# Patient Record
Sex: MALE | Race: BLACK OR AFRICAN AMERICAN | NOT HISPANIC OR LATINO | URBAN - METROPOLITAN AREA
[De-identification: names, ages, dates, MRNs, and addresses within clinical notes are randomized per-mention and may not be internally consistent; named-entity substitution may affect disease eponyms.]

---

## 2017-05-04 ENCOUNTER — EMERGENCY (EMERGENCY)
Facility: HOSPITAL | Age: 22
LOS: 1 days | Discharge: HOME | End: 2017-05-04
Attending: EMERGENCY MEDICINE | Admitting: EMERGENCY MEDICINE
Payer: COMMERCIAL

## 2017-05-04 VITALS
HEART RATE: 89 BPM | RESPIRATION RATE: 18 BRPM | OXYGEN SATURATION: 97 % | TEMPERATURE: 98 F | DIASTOLIC BLOOD PRESSURE: 83 MMHG | SYSTOLIC BLOOD PRESSURE: 140 MMHG

## 2017-05-04 DIAGNOSIS — D17.1 BENIGN LIPOMATOUS NEOPLASM OF SKIN AND SUBCUTANEOUS TISSUE OF TRUNK: Chronic | ICD-10-CM

## 2017-05-04 PROCEDURE — 99283 EMERGENCY DEPT VISIT LOW MDM: CPT

## 2017-05-04 RX ORDER — IBUPROFEN 200 MG
600 TABLET ORAL ONCE
Qty: 0 | Refills: 0 | Status: COMPLETED | OUTPATIENT
Start: 2017-05-04 | End: 2017-05-04

## 2017-05-04 RX ADMIN — Medication 600 MILLIGRAM(S): at 20:27

## 2017-05-04 NOTE — ED PROVIDER NOTE - MUSCULOSKELETAL MINIMAL EXAM
mild tenderness to palpation over L parietal area, tenderness L cervical sternocleidomastoid, mild L paravertebral tenderness
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
## 2017-05-04 NOTE — ED PROVIDER NOTE - CHPI ED SYMPTOMS NEG
no difficulty bearing weight/no decreased eating/drinking/no LOC, no abd pain, no nausea, no vomiting, no numbness/weakness,

## 2017-05-04 NOTE — ED PROVIDER NOTE - ENMT, MLM
Airway patent, Nasal mucosa clear. Mouth with normal mucosa. Throat has no vesicles, no oropharyngeal exudates and uvula is midline. Airway patent, Nasal mucosa clear. Mouth with normal mucosa. Throat has no vesicles, no oropharyngeal exudates and uvula is midline.TM nml, no otorrhea
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
## 2017-05-04 NOTE — ED PROVIDER NOTE - NS ED MD SCRIBE ATTENDING SCRIBE SECTIONS
VITAL SIGNS( Pullset)/REVIEW OF SYSTEMS/PHYSICAL EXAM/DISPOSITION/PAST MEDICAL/SURGICAL/SOCIAL HISTORY/HISTORY OF PRESENT ILLNESS/HIV

## 2018-01-08 ENCOUNTER — EMERGENCY (EMERGENCY)
Facility: HOSPITAL | Age: 23
LOS: 0 days | Discharge: HOME | End: 2018-01-08
Attending: EMERGENCY MEDICINE
Payer: COMMERCIAL

## 2018-01-08 VITALS
HEIGHT: 65 IN | WEIGHT: 160.06 LBS | RESPIRATION RATE: 16 BRPM | HEART RATE: 100 BPM | SYSTOLIC BLOOD PRESSURE: 151 MMHG | DIASTOLIC BLOOD PRESSURE: 95 MMHG | OXYGEN SATURATION: 95 % | TEMPERATURE: 98 F

## 2018-01-08 DIAGNOSIS — D17.1 BENIGN LIPOMATOUS NEOPLASM OF SKIN AND SUBCUTANEOUS TISSUE OF TRUNK: Chronic | ICD-10-CM

## 2018-01-08 DIAGNOSIS — J45.901 UNSPECIFIED ASTHMA WITH (ACUTE) EXACERBATION: ICD-10-CM

## 2018-01-08 DIAGNOSIS — Z91.013 ALLERGY TO SEAFOOD: ICD-10-CM

## 2018-01-08 DIAGNOSIS — H54.8 LEGAL BLINDNESS, AS DEFINED IN USA: ICD-10-CM

## 2018-01-08 PROCEDURE — 99284 EMERGENCY DEPT VISIT MOD MDM: CPT

## 2018-01-08 RX ORDER — ALBUTEROL 90 UG/1
2 AEROSOL, METERED ORAL
Qty: 1 | Refills: 0 | OUTPATIENT
Start: 2018-01-08 | End: 2018-02-06

## 2018-01-08 RX ORDER — IPRATROPIUM/ALBUTEROL SULFATE 18-103MCG
3 AEROSOL WITH ADAPTER (GRAM) INHALATION ONCE
Qty: 0 | Refills: 0 | Status: COMPLETED | OUTPATIENT
Start: 2018-01-08 | End: 2018-01-08

## 2018-01-08 RX ORDER — ALBUTEROL 90 UG/1
2 AEROSOL, METERED ORAL
Qty: 1 | Refills: 0 | OUTPATIENT
Start: 2018-01-08 | End: 2018-01-11

## 2018-01-08 RX ADMIN — Medication 3 MILLILITER(S): at 21:15

## 2018-01-08 RX ADMIN — Medication 60 MILLIGRAM(S): at 20:57

## 2018-01-08 RX ADMIN — Medication 3 MILLILITER(S): at 20:57

## 2018-01-08 NOTE — ED PROVIDER NOTE - MEDICAL DECISION MAKING DETAILS
Patient presents with asthma exacerbation, stable, +hypertensive here - discussed with him will need PMD fu. Plan for nebs, steroids, observation.

## 2018-01-08 NOTE — ED PROVIDER NOTE - OBJECTIVE STATEMENT
22M here with asthma exacerbation. He reports a history of asthma, here with 1 day of nonproductive cough, nasal congestion, chest tightness, feels like prior episode of asthma. No chest pain. No fever, chills, nausea, vomiting. No leg pain or swelling. He has been hospitalized for asthma but never intubated. He takes albuterol pump PRN no other meds. No tobacco use.

## 2018-01-08 NOTE — ED PROVIDER NOTE - ATTENDING CONTRIBUTION TO CARE
22 year old male PMHx asthma c/o asthma attack. PE: NAD, mild expiratory wheezing, respirations nonlabored. I&P: asthma exacerbation, bronchodilators, corticosteroids, PMD follow up

## 2018-01-08 NOTE — ED ADULT NURSE NOTE - OBJECTIVE STATEMENT
patient stated that his asthma was acting up for 2 days and got worse tonight, reports cold symptoms, dry cough, denies fever, denies chills

## 2018-04-14 ENCOUNTER — EMERGENCY (EMERGENCY)
Facility: HOSPITAL | Age: 23
LOS: 1 days | Discharge: HOME | End: 2018-04-14
Attending: EMERGENCY MEDICINE | Admitting: EMERGENCY MEDICINE
Payer: COMMERCIAL

## 2018-04-14 VITALS
DIASTOLIC BLOOD PRESSURE: 80 MMHG | HEART RATE: 98 BPM | SYSTOLIC BLOOD PRESSURE: 140 MMHG | RESPIRATION RATE: 18 BRPM | OXYGEN SATURATION: 99 %

## 2018-04-14 VITALS
TEMPERATURE: 98 F | HEART RATE: 102 BPM | DIASTOLIC BLOOD PRESSURE: 84 MMHG | RESPIRATION RATE: 16 BRPM | OXYGEN SATURATION: 97 % | SYSTOLIC BLOOD PRESSURE: 132 MMHG

## 2018-04-14 DIAGNOSIS — D17.1 BENIGN LIPOMATOUS NEOPLASM OF SKIN AND SUBCUTANEOUS TISSUE OF TRUNK: Chronic | ICD-10-CM

## 2018-04-14 PROCEDURE — 99284 EMERGENCY DEPT VISIT MOD MDM: CPT | Mod: 25

## 2018-04-14 RX ORDER — ALBUTEROL 90 UG/1
2.5 AEROSOL, METERED ORAL ONCE
Qty: 0 | Refills: 0 | Status: COMPLETED | OUTPATIENT
Start: 2018-04-14 | End: 2018-04-14

## 2018-04-14 RX ORDER — IPRATROPIUM/ALBUTEROL SULFATE 18-103MCG
3 AEROSOL WITH ADAPTER (GRAM) INHALATION ONCE
Qty: 0 | Refills: 0 | Status: COMPLETED | OUTPATIENT
Start: 2018-04-14 | End: 2018-04-14

## 2018-04-14 RX ORDER — ALBUTEROL 90 UG/1
2 AEROSOL, METERED ORAL
Qty: 1 | Refills: 0 | OUTPATIENT
Start: 2018-04-14

## 2018-04-14 RX ORDER — ALBUTEROL 90 UG/1
2 AEROSOL, METERED ORAL ONCE
Qty: 0 | Refills: 0 | Status: COMPLETED | OUTPATIENT
Start: 2018-04-14 | End: 2018-04-14

## 2018-04-14 RX ADMIN — ALBUTEROL 2 PUFF(S): 90 AEROSOL, METERED ORAL at 09:07

## 2018-04-14 RX ADMIN — Medication 50 MILLIGRAM(S): at 07:57

## 2018-04-14 RX ADMIN — Medication 3 MILLILITER(S): at 07:53

## 2018-04-14 RX ADMIN — ALBUTEROL 2.5 MILLIGRAM(S): 90 AEROSOL, METERED ORAL at 08:01

## 2018-04-14 RX ADMIN — ALBUTEROL 2.5 MILLIGRAM(S): 90 AEROSOL, METERED ORAL at 07:57

## 2018-04-14 NOTE — ED PROVIDER NOTE - MEDICAL DECISION MAKING DETAILS
22M pmh asthma p/w asthma exacerbation.  Will get peak flow, give neb treatments, steroids and reassess need for admission vs discahrge.  - Gwen Bender,

## 2018-04-14 NOTE — ED PROVIDER NOTE - PROGRESS NOTE DETAILS
Peak flow 150 prior to treatment.  - Gwen Bender, DO Peak flow 300 after treatment and patient feels his breathing has improved.  Scattered wheezes on exam, with improved air movement.  - Gwen Bender, DO JAXSON Barnes MD, Attending Physician  Pt comfortable breathing better. P is able to go home

## 2018-04-14 NOTE — ED ADULT TRIAGE NOTE - CHIEF COMPLAINT QUOTE
pt c/o chest tightness and wheezing since last night, no relief from albuterol and nebulizer at home. given 2 albuterol treatments by ems. pt denies chest tightness or sob curerntly. speaking in full sentences, tolerating room air, no respiratory distress noted

## 2018-04-14 NOTE — ED PROVIDER NOTE - CARE PLAN
Principal Discharge DX:	Asthma exacerbation Principal Discharge DX:	Asthma exacerbation  Assessment and plan of treatment:	- 2 prescriptions were sent to your pharmacy - please take as prescribed  - Please follow up with your Primary doctor   - Return to the ER with any worsening shortness of breath, chest pain, or any other concerning symptoms

## 2018-04-14 NOTE — ED PROVIDER NOTE - PHYSICAL EXAMINATION
Gen: NAD, AOx3, speaking in full sentences  Head: NCAT  HEENT: PERRL, oral mucosa moist, normal conjunctiva, no erythema or exudates of throat  Lung: good air movement, diffuse expiratory wheezes, no retractions, no respiratory distress  CV: rrr, no murmurs, Normal perfusion  Abd: soft, NTND  MSK: No edema, no visible deformities  Neuro: No focal neurologic deficits, normal gait  Skin: No rash   Psych: normal affect   - Gwen Ortega, DO

## 2018-04-14 NOTE — ED ADULT NURSE REASSESSMENT NOTE - NS ED NURSE REASSESS COMMENT FT1
peak flow after neb treatments 300. pt feeling better/ wtill haves dry cough/ color good in resp distres/

## 2018-04-14 NOTE — ED PROVIDER NOTE - PLAN OF CARE
- 2 prescriptions were sent to your pharmacy - please take as prescribed  - Please follow up with your Primary doctor   - Return to the ER with any worsening shortness of breath, chest pain, or any other concerning symptoms

## 2018-04-14 NOTE — PROVIDER CONTACT NOTE (OTHER) - ASSESSMENT
SW contacted by Pt's ER MD Loja, informing Pt requires assistance to return home. SW met with Pt who is A&Ox3 and is legally blind he reports no one is available to pick him up and has no money. Pt requires livery assistance return home. SW contacted [facility] [phone] spoke with staff Tish who provided invoice# [id number] for [employer] with ETA of 20 minutes. Pt and medical staff are aware and in agreement with plan. No further SW intervention required at this time.
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
## 2018-04-14 NOTE — ED ADULT NURSE NOTE - OBJECTIVE STATEMENT
Presents to ED complaining of SOB since yesterday that progressively worsen.  History of Asthma, used neb tx at home without relief.  Complaining of cold-like sx.  Audible wheezing noted.  MD at bedside and will continue to monitor patient.
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
## 2019-05-03 ENCOUNTER — EMERGENCY (EMERGENCY)
Facility: HOSPITAL | Age: 24
LOS: 1 days | Discharge: HOME | End: 2019-05-03
Attending: EMERGENCY MEDICINE | Admitting: EMERGENCY MEDICINE
Payer: COMMERCIAL

## 2019-05-03 VITALS
RESPIRATION RATE: 18 BRPM | HEART RATE: 85 BPM | TEMPERATURE: 99 F | OXYGEN SATURATION: 100 % | DIASTOLIC BLOOD PRESSURE: 82 MMHG | SYSTOLIC BLOOD PRESSURE: 130 MMHG

## 2019-05-03 VITALS
DIASTOLIC BLOOD PRESSURE: 84 MMHG | TEMPERATURE: 97 F | OXYGEN SATURATION: 98 % | HEART RATE: 82 BPM | RESPIRATION RATE: 16 BRPM | SYSTOLIC BLOOD PRESSURE: 144 MMHG

## 2019-05-03 DIAGNOSIS — D17.1 BENIGN LIPOMATOUS NEOPLASM OF SKIN AND SUBCUTANEOUS TISSUE OF TRUNK: Chronic | ICD-10-CM

## 2019-05-03 PROCEDURE — 99283 EMERGENCY DEPT VISIT LOW MDM: CPT | Mod: 25

## 2019-05-03 RX ORDER — METHOCARBAMOL 500 MG/1
750 TABLET, FILM COATED ORAL ONCE
Qty: 0 | Refills: 0 | Status: COMPLETED | OUTPATIENT
Start: 2019-05-03 | End: 2019-05-03

## 2019-05-03 RX ORDER — METHOCARBAMOL 500 MG/1
2 TABLET, FILM COATED ORAL
Qty: 30 | Refills: 0 | OUTPATIENT
Start: 2019-05-03 | End: 2019-05-07

## 2019-05-03 RX ORDER — ACETAMINOPHEN 500 MG
975 TABLET ORAL ONCE
Qty: 0 | Refills: 0 | Status: COMPLETED | OUTPATIENT
Start: 2019-05-03 | End: 2019-05-03

## 2019-05-03 RX ORDER — IBUPROFEN 200 MG
1 TABLET ORAL
Qty: 28 | Refills: 0 | OUTPATIENT
Start: 2019-05-03 | End: 2019-05-09

## 2019-05-03 RX ORDER — LIDOCAINE 4 G/100G
1 CREAM TOPICAL ONCE
Qty: 0 | Refills: 0 | Status: COMPLETED | OUTPATIENT
Start: 2019-05-03 | End: 2019-05-03

## 2019-05-03 RX ORDER — IBUPROFEN 200 MG
600 TABLET ORAL ONCE
Qty: 0 | Refills: 0 | Status: COMPLETED | OUTPATIENT
Start: 2019-05-03 | End: 2019-05-03

## 2019-05-03 RX ADMIN — METHOCARBAMOL 750 MILLIGRAM(S): 500 TABLET, FILM COATED ORAL at 06:25

## 2019-05-03 RX ADMIN — LIDOCAINE 1 PATCH: 4 CREAM TOPICAL at 06:10

## 2019-05-03 RX ADMIN — Medication 975 MILLIGRAM(S): at 06:09

## 2019-05-03 RX ADMIN — Medication 600 MILLIGRAM(S): at 06:09
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
## 2019-05-03 NOTE — ED ADULT NURSE NOTE - NSIMPLEMENTINTERV_GEN_ALL_ED
Implemented All Fall Risk Interventions:  [place] to call system. Call bell, personal items and telephone within reach. Instruct patient to call for assistance. Room bathroom lighting operational. Non-slip footwear when patient is off stretcher. Physically safe environment: no spills, clutter or unnecessary equipment. Stretcher in lowest position, wheels locked, appropriate side rails in place. Provide visual cue, wrist band, yellow gown, etc. Monitor gait and stability. Monitor for mental status changes and reorient to person, place, and time. Review medications for side effects contributing to fall risk. Reinforce activity limits and safety measures with patient and family.

## 2019-05-03 NOTE — ED PROVIDER NOTE - NSFOLLOWUPINSTRUCTIONS_ED_ALL_ED_FT
Follow up with your primary doctor within 24-48 hours for further evaluation.  Take ibuprofen 600 mg every 6 hours for pain control.  Take methocarbamol 1500 mg every 12 hours for muscle spasm.    Back Pain    Back pain is very common in adults. The cause of back pain is rarely dangerous and the pain often gets better over time. The cause of your back pain may not be known and may include strain of muscles or ligaments, degeneration of the spinal disks, or arthritis. Occasionally the pain may radiate down your leg(s). Over-the-counter medicines to reduce pain and inflammation are often the most helpful. Stretching and remaining active frequently helps the healing process.     SEEK IMMEDIATE MEDICAL CARE IF YOU HAVE ANY OF THE FOLLOWING SYMPTOMS: bowel or bladder control problems, unusual weakness or numbness in your arms or legs, nausea or vomiting, abdominal pain, fever, dizziness/lightheadedness.

## 2019-05-03 NOTE — ED PROVIDER NOTE - CLINICAL SUMMARY MEDICAL DECISION MAKING FREE TEXT BOX
24 y/o M with lower back pain radiating to R lower leg. radicular pain. no concerning features. pain control, reassess
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
## 2019-05-03 NOTE — ED ADULT TRIAGE NOTE - CHIEF COMPLAINT QUOTE
Pt BIBA for chronic back pain.  States pain never this bad.  Pain radiating to R leg.  denies any urinary or fecal incontinence, saddle anesthesia.    Pmhx. legally blind, bulging disk, asthma.

## 2019-05-13 ENCOUNTER — EMERGENCY (EMERGENCY)
Facility: HOSPITAL | Age: 24
LOS: 1 days | Discharge: HOME | End: 2019-05-13
Admitting: EMERGENCY MEDICINE
Payer: COMMERCIAL

## 2019-05-13 VITALS
HEART RATE: 84 BPM | TEMPERATURE: 99 F | DIASTOLIC BLOOD PRESSURE: 76 MMHG | SYSTOLIC BLOOD PRESSURE: 117 MMHG | OXYGEN SATURATION: 97 % | RESPIRATION RATE: 16 BRPM

## 2019-05-13 DIAGNOSIS — Z98.890 OTHER SPECIFIED POSTPROCEDURAL STATES: Chronic | ICD-10-CM

## 2019-05-13 DIAGNOSIS — D17.1 BENIGN LIPOMATOUS NEOPLASM OF SKIN AND SUBCUTANEOUS TISSUE OF TRUNK: Chronic | ICD-10-CM

## 2019-05-13 PROCEDURE — 99283 EMERGENCY DEPT VISIT LOW MDM: CPT

## 2019-05-13 RX ORDER — IBUPROFEN 200 MG
600 TABLET ORAL ONCE
Refills: 0 | Status: COMPLETED | OUTPATIENT
Start: 2019-05-13 | End: 2019-05-13

## 2019-05-13 RX ORDER — DIAZEPAM 5 MG
5 TABLET ORAL ONCE
Refills: 0 | Status: DISCONTINUED | OUTPATIENT
Start: 2019-05-13 | End: 2019-05-13

## 2019-05-13 RX ORDER — DIAZEPAM 5 MG
1 TABLET ORAL
Qty: 10 | Refills: 0
Start: 2019-05-13

## 2019-05-13 RX ORDER — LIDOCAINE 4 G/100G
1 CREAM TOPICAL ONCE
Refills: 0 | Status: COMPLETED | OUTPATIENT
Start: 2019-05-13 | End: 2019-05-13

## 2019-05-13 RX ORDER — ACETAMINOPHEN 500 MG
975 TABLET ORAL ONCE
Refills: 0 | Status: COMPLETED | OUTPATIENT
Start: 2019-05-13 | End: 2019-05-13

## 2019-05-13 RX ADMIN — Medication 5 MILLIGRAM(S): at 13:47

## 2019-05-13 RX ADMIN — Medication 975 MILLIGRAM(S): at 13:47

## 2019-05-13 RX ADMIN — Medication 600 MILLIGRAM(S): at 13:47

## 2019-05-13 RX ADMIN — Medication 600 MILLIGRAM(S): at 14:34

## 2019-05-13 RX ADMIN — Medication 975 MILLIGRAM(S): at 14:34

## 2019-05-13 RX ADMIN — LIDOCAINE 1 PATCH: 4 CREAM TOPICAL at 13:48

## 2019-05-13 NOTE — ED PROVIDER NOTE - NSFOLLOWUPINSTRUCTIONS_ED_ALL_ED_FT
Take motrin 600mg every 8 hours.  Valium 5mg every 8 hours.  Avoid any strenuous activity.  Follow up with your orthopedic spine specialist within 1-2 weeks.  Return to ED for any worsening pain, weakness, fever.

## 2019-05-13 NOTE — ED PROVIDER NOTE - OBJECTIVE STATEMENT
24 yo male c pmhx asthma, legally blind, chronic back pain s/p diskectomy  presents to ED for worsening right low back pain this AM radiating down to right leg.  Pt was seen in ED for same last week and was given motrin which he has been taking without any relief.  Pt has seen pain management in the past, had epidural injections.   Pain 10/10 when he is walking. Pt denies any fever, chills, numbness, weakness, bowel or bladder incontinence, recent fall or trauma.
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
## 2019-05-13 NOTE — PROVIDER CONTACT NOTE (OTHER) - ASSESSMENT
medical team referred this case as pt requires assistance to return home. Writer met with Pt who is A&Ox3 and is legally blind he reports no one is available to pick him up and has no money. Pt requires livery assistance return home. Writer contacted [employer] [phone] spoke with Ms. Burns  who provided invoice# [id number] for [email or web address] with ETA and vendor has 2 hours window period to respond. Daisyr reached out to [employer] - [phone] spoke with Ms. [place] [employer] will be come to adult ED ramp  the pt with in 4 hours. writer informed medical team and pt and in agreement. No further SW intervention needed for this visit.

## 2020-02-07 NOTE — ED PROVIDER NOTE - CLINICAL SUMMARY MEDICAL DECISION MAKING FREE TEXT BOX
<-- Click to add NO significant Past Surgical History 24 yo male c pmhx asthma, legally blind, chronic back pain s/p diskectomy  presents to ED for worsening right low back pain this AM radiating down to right leg.  Pt able to ambulate with a limp.  Has not made appt with his orthopedic yet. +SLR to the right leg, otherwise no neuro deficits or red flags, likely sciatica, will give motrin, tylenol, lidoderm patch, valium and reassess.  Advised pt to follow up with his spine surgeon.

## 2022-10-17 NOTE — ED PROVIDER NOTE - DATE/TIME 1
Procedure   Large Joint Arthrocentesis: L knee  Date/Time: 10/17/2022 8:28 AM  Consent given by: patient  Site marked: site marked  Timeout: Immediately prior to procedure a time out was called to verify the correct patient, procedure, equipment, support staff and site/side marked as required   Supporting Documentation  Indications: pain   Procedure Details  Location: knee - L knee  Preparation: Patient was prepped and draped in the usual sterile fashion  Needle size: 22 G  Approach: anterolateral  Medications administered: 40 mg triamcinolone acetonide 40 MG/ML; 4 mL ropivacaine 0.5 %  Patient tolerance: patient tolerated the procedure well with no immediate complications            08-Jan-2018 21:23

## 2024-02-01 NOTE — ED ADULT NURSE NOTE - PRIMARY CARE PROVIDER
Multifactorial:  Pneumonia, decreased activity level, poor oral intake   Continue PT/OT efforts   Maintain Fall precautions     no stated